# Patient Record
Sex: FEMALE | Race: WHITE | Employment: FULL TIME | URBAN - METROPOLITAN AREA
[De-identification: names, ages, dates, MRNs, and addresses within clinical notes are randomized per-mention and may not be internally consistent; named-entity substitution may affect disease eponyms.]

---

## 2019-09-24 ENCOUNTER — HOSPITAL ENCOUNTER (EMERGENCY)
Age: 44
Discharge: HOME | End: 2019-09-24
Attending: EMERGENCY MEDICINE
Payer: COMMERCIAL

## 2019-09-24 VITALS
BODY MASS INDEX: 23.28 KG/M2 | WEIGHT: 144.84 LBS | SYSTOLIC BLOOD PRESSURE: 133 MMHG | DIASTOLIC BLOOD PRESSURE: 81 MMHG | HEART RATE: 94 BPM | HEIGHT: 66 IN | RESPIRATION RATE: 16 BRPM | OXYGEN SATURATION: 98 % | TEMPERATURE: 97.8 F

## 2019-09-24 DIAGNOSIS — S01.511A LIP LACERATION, INITIAL ENCOUNTER: Primary | ICD-10-CM

## 2019-09-24 PROCEDURE — 74011250637 HC RX REV CODE- 250/637: Performed by: EMERGENCY MEDICINE

## 2019-09-24 PROCEDURE — 74011000250 HC RX REV CODE- 250

## 2019-09-24 PROCEDURE — 99283 EMERGENCY DEPT VISIT LOW MDM: CPT

## 2019-09-24 PROCEDURE — 74011000250 HC RX REV CODE- 250: Performed by: EMERGENCY MEDICINE

## 2019-09-24 PROCEDURE — 75810000293 HC SIMP/SUPERF WND  RPR

## 2019-09-24 RX ORDER — BACITRACIN 500 UNIT/G
PACKET (EA) TOPICAL
Status: COMPLETED
Start: 2019-09-24 | End: 2019-09-24

## 2019-09-24 RX ORDER — LIDOCAINE HYDROCHLORIDE 20 MG/ML
10 INJECTION, SOLUTION INFILTRATION; PERINEURAL
Status: COMPLETED | OUTPATIENT
Start: 2019-09-24 | End: 2019-09-24

## 2019-09-24 RX ORDER — IBUPROFEN 400 MG/1
800 TABLET ORAL
Status: COMPLETED | OUTPATIENT
Start: 2019-09-24 | End: 2019-09-24

## 2019-09-24 RX ORDER — BACITRACIN 500 UNIT/G
1 PACKET (EA) TOPICAL 3 TIMES DAILY
Status: DISCONTINUED | OUTPATIENT
Start: 2019-09-24 | End: 2019-09-24

## 2019-09-24 RX ORDER — BACITRACIN 500 UNIT/G
1 PACKET (EA) TOPICAL 3 TIMES DAILY
Status: DISCONTINUED | OUTPATIENT
Start: 2019-09-24 | End: 2019-09-24 | Stop reason: HOSPADM

## 2019-09-24 RX ADMIN — IBUPROFEN 800 MG: 400 TABLET, FILM COATED ORAL at 05:47

## 2019-09-24 RX ADMIN — Medication 1 PACKET: at 06:38

## 2019-09-24 RX ADMIN — BACITRACIN 1 PACKET: 500 OINTMENT TOPICAL at 06:38

## 2019-09-24 RX ADMIN — LIDOCAINE HYDROCHLORIDE 200 MG: 20 INJECTION, SOLUTION INFILTRATION; PERINEURAL at 05:50

## 2019-09-24 NOTE — DISCHARGE INSTRUCTIONS
Patient Education      we repaired the outer lip laceration with absorbable sutures. Keep the area clean and dry for 24 hours. It can then get wet but do not soak and dry well. Apply antibiotic ointment twice daily and keep covered. Any concerns for infection- redness, drainage, fever, etc. Return here. The laceration inside your mouth will heal quickly. Avoid acidic foods that may sting. A soft diet for a few days will help. Rinse your mouth out with warm water, or 1/2 peroxide 1/2 water after you eat. Follow up with your dentist if you have any concerns about that wound healing. Lip Laceration: Care Instructions  Your Care Instructions    A cut (laceration) on your lip can be on the outside of your mouth, or it may include the skin inside your mouth. Cuts to the lip usually heal quickly. But your lip may be sore while it heals. The doctor used stitches to close the cut. Using stitches helps the cut heal. The doctor may also have called in a specialist, such as a plastic surgeon, to close the cut. Your cut may leave a scar that will fade over time. The doctor took special care to close the cut so that the edges line up. This can help reduce scarring. If the cut went deep and through the skin, the doctor may have put in two layers of stitches. The deeper layer brings the deep part of the cut together. These stitches will dissolve and don't need to be removed. The stitches in the upper layer are the ones you see on the cut. You may have strips of tape covering part of the cut. Your stitches may dissolve on their own. Or the doctor may need to remove the stitches in about 3 to 5 days. The doctor has checked you carefully, but problems can develop later. If you notice any problems or new symptoms, get medical treatment right away. Follow-up care is a key part of your treatment and safety. Be sure to make and go to all appointments, and call your doctor if you are having problems.  It's also a good idea to know your test results and keep a list of the medicines you take. How can you care for yourself at home? · Put ice or a cold pack on the area for 10 to 20 minutes at a time. Put a thin cloth between the ice and your skin. · If the cut is inside your mouth:  ? Rinse your mouth with warm salt water right after meals. Saltwater rinses may help healing. To make a saltwater solution for rinsing the mouth, mix 1 tsp of salt in 1 cup of warm water. ? Eat soft foods that are easy to chew. Avoid foods that might sting. These include salty or spicy foods, citrus fruits or juices, and tomatoes. ? Try using a topical medicine, such as Orabase, to reduce mouth pain. · Do not use a straw until your lip is healed. · If your doctor told you how to care for your cut, follow your doctor's instructions. If you did not get instructions, follow this general advice:  ? After the first 24 to 48 hours, wash around the cut with clean water 2 times a day. Don't use hydrogen peroxide or alcohol, which can slow healing. · If you have strips of tape on the cut, leave the tape on for a week or until it falls off. · If your doctor prescribed antibiotics, take them as directed. Do not stop taking them just because you feel better. You need to take the full course of antibiotics. · Be safe with medicines. Read and follow all instructions on the label. ? If the doctor gave you a prescription medicine for pain, take it as prescribed. ? If you are not taking a prescription pain medicine, ask your doctor if you can take an over-the-counter medicine. · Avoid any activity that could cause the cut to reopen. · Do not remove the stitches on your own. Your doctor will tell you when to come back to have the stitches removed. When should you call for help? Call your doctor now or seek immediate medical care if:    · The cut starts to bleed.  Oozing small amounts of blood is normal.     · You have symptoms of infection, such as:  ? Increased pain, swelling, warmth, or redness around the cut.  ? Red streaks leading from the cut.  ? Pus draining from the cut.  ? A fever.    Watch closely for changes in your health, and be sure to contact your doctor if:    · The cut reopens.     · You do not get better as expected. Where can you learn more? Go to http://franko-radha.info/. Enter [id number] in the search box to learn more about \"Lip Laceration: Care Instructions. \"  Current as of: June 26, 2019  Content Version: 12.2  © 2043-2009 [employer]. Care instructions adapted under license by [employer] (which disclaims liability or warranty for this information). If you have questions about a medical condition or this instruction, always ask your healthcare professional. [address] any warranty or liability for your use of this information. Patient Education     Cut in the Mouth: Care Instructions  Your Care Instructions  A cut in the mouth may be on your lips. It could also be inside your mouth. Many times, the cut is left open and stitches are not needed. But sometimes stitches help with healing or to stop bleeding. In some cases, the doctor will want to do some tests to check for other problems, like a tooth injury. These tests include imaging tests like an X-ray or a CT scan. If you have stitches, they will often dissolve on their own. But sometimes a doctor needs to take them out. Stitches are usually removed in about 5 days, but it may depend on the type of cut you have. The doctor has checked you carefully, but problems can develop later. If you notice any problems or new symptoms, get medical treatment right away. Follow-up care is a key part of your treatment and safety. Be sure to make and go to all appointments, and call your doctor if you are having problems. It's also a good idea to know your test results and keep a list of the medicines you take.   How can you care for yourself at home? · If your doctor prescribed antibiotics, take them as directed. Do not stop taking them just because you feel better. You need to take the full course of antibiotics. · If you have pain, take an over-the-counter pain medicine, such as acetaminophen (Tylenol), ibuprofen (Advil, Motrin), or naproxen (Aleve). Be safe with medicines. Read and follow all instructions on the label. · It may help to cool the inside of your mouth with a piece of ice or a flavored ice pop. · If the cut is inside your mouth:  ¨ Rinse your mouth with warm salt water right after meals. Saltwater rinses may help healing. To make a saltwater solution for rinsing the mouth, mix 1 tsp of salt in 1 cup of warm water. ¨ Eat soft foods that are easy to swallow. ¨ Avoid foods that might sting. These include salty or spicy foods, citrus fruits or juices, and tomatoes. ¨ Try using a topical medicine, such as Orabase, to reduce mouth pain. When should you call for help? Call 911 anytime you think you may need emergency care. For example, call if:  · You have trouble breathing. Call your doctor now or seek immediate medical care if:  · You have problems swallowing. · The cut starts to bleed. Oozing small amounts of blood is normal.  · You have symptoms of infection, such as:  ¨ Increased pain, swelling, warmth, or redness around the cut. ¨ Red streaks leading from the cut. ¨ Pus draining from the cut. ¨ A fever. Watch closely for changes in your health, and be sure to contact your doctor if:  · You notice a new problem like a tooth injury. · You do not get better as expected. Where can you learn more? Go to [employer]  Enter J207 in the search box to learn more about \"Cut in the Mouth: Care Instructions. \"   © 4650-2165 [employer]. Care instructions adapted under license by [employer] (which disclaims liability or warranty for this information).  This care instruction is for use with your licensed healthcare professional. If you have questions about a medical condition or this instruction, always ask your healthcare professional. [place] [address] any warranty or liability for your use of this information.   Content Version: 26.2.828252; Current as of: November 20, 2015

## 2019-09-24 NOTE — ED PROVIDER NOTES
HPI     57-year-old female without significant past medical history states she got up this morning she states she knew she needed to get take her mouthguard out so she got up out of bed was trying to close the mouth coordinates case when she thinks she slipped and hit the dresser she went to the bathroom noticed the laceration to her mouth and could taste blood her  was in the bathroom with her at that point and states she became very weak he caught her and lowered her to the ground he states the whole episode lasted less than 5 seconds. She denies any preceding chest pain palpitations or feeling faint. She currently feels well other than the pain to her right side of her face. She denies a headache she denies any neck or back pain she denies any extremity pain. She states she has been in her usual state of health went to bed feeling fine she has had no recent fever chest pain short of breath abdominal pain nausea vomiting diarrhea. History reviewed. No pertinent past medical history. History reviewed. No pertinent surgical history. History reviewed. No pertinent family history.     Social History     Socioeconomic History    Marital status:      Spouse name: Not on file    Number of children: Not on file    Years of education: Not on file    Highest education level: Not on file   Occupational History    Not on file   Social Needs    Financial resource strain: Not on file    Food insecurity:     Worry: Not on file     Inability: Not on file    Transportation needs:     Medical: Not on file     Non-medical: Not on file   Tobacco Use    Smoking status: Never Smoker    Smokeless tobacco: Never Used   Substance and Sexual Activity    Alcohol use: Yes     Comment: socially    Drug use: Never    Sexual activity: Not on file   Lifestyle    Physical activity:     Days per week: Not on file     Minutes per session: Not on file    Stress: Not on file   Relationships    Social connections:     Talks on phone: Not on file     Gets together: Not on file     Attends Mosque service: Not on file     Active member of club or organization: Not on file     Attends meetings of clubs or organizations: Not on file     Relationship status: Not on file    Intimate partner violence:     Fear of current or ex partner: Not on file     Emotionally abused: Not on file     Physically abused: Not on file     Forced sexual activity: Not on file   Other Topics Concern    Not on file   Social History Narrative    Not on file         ALLERGIES: Patient has no known allergies. Review of Systems   Constitutional: Negative for fever. HENT: Negative for congestion. Eyes: Negative for visual disturbance. Respiratory: Negative for cough and shortness of breath. Cardiovascular: Negative for chest pain. Gastrointestinal: Negative for abdominal pain, nausea and vomiting. Genitourinary: Negative for dysuria. Musculoskeletal: Negative for gait problem. Skin: Positive for wound. Negative for rash. Neurological: Negative for headaches. Psychiatric/Behavioral: Negative for dysphoric mood. Vitals:    09/24/19 0457   BP: 130/83   Pulse: (!) 107   Resp: 16   Temp: 97.8 °F (36.6 °C)   SpO2: 100%   Weight: 65.7 kg (144 lb 13.5 oz)   Height: 5' 6\" (1.676 m)            Physical Exam   Constitutional: She is oriented to person, place, and time. She appears well-developed and well-nourished. No distress. HENT:   Head: Normocephalic and atraumatic. Mouth/Throat: No oropharyngeal exudate. No dental laxity  1cm intraoral laceration     Eyes: Pupils are equal, round, and reactive to light. Right eye exhibits no discharge. Left eye exhibits no discharge. No scleral icterus. Neck: Normal range of motion. Neck supple. No JVD present. Cardiovascular: Normal rate, regular rhythm and normal heart sounds. No murmur heard. Pulmonary/Chest: Effort normal and breath sounds normal. No stridor.  No respiratory distress. She has no wheezes. She has no rales. She exhibits no tenderness. Abdominal: Soft. Bowel sounds are normal. She exhibits no distension and no mass. There is no tenderness. There is no rebound and no guarding. Musculoskeletal: Normal range of motion. Neurological: She is oriented to person, place, and time. Skin: Skin is warm and dry. Capillary refill takes less than 2 seconds. No rash noted. 1cm laceration to right lower lip abuts but does not cross the vermillion border. Laceration is not through and through   Psychiatric: She has a normal mood and affect. Her behavior is normal. Judgment and thought content normal.        [facility]       Wound Repair  Date/Time: 9/24/2019 6:27 AM  Performed by: attendingPreparation: skin prepped with alcohol  Location details: face  Wound length:2.5 cm or less    Anesthesia:  Local Anesthetic: lidocaine 2% without epinephrine  Foreign bodies: no foreign bodies  Irrigation solution: saline  Irrigation method: jet lavage  Debridement: none  Subcutaneous closure: gut  Number of sutures: 4  Technique: simple  Approximation: close  Lip approximation: vermillion border well aligned  Dressing: antibiotic ointment  My total time at bedside, performing this procedure was 1-15 minutes.

## 2019-09-24 NOTE — ED TRIAGE NOTES
I took my mouth guard out and I don't know if I was sleep walking or what but I fell up against the dresser and cut my lip.

## 2019-09-24 NOTE — ED NOTES
Discharge instructions given to pt by RN. Pt educated on prescribed medications in teach back method and verbalizes understanding. Opportunity for questions provided.  Pt ambulatory out of unit, in no acute distress and taken home by spouse